# Patient Record
Sex: MALE | Race: WHITE | NOT HISPANIC OR LATINO | Employment: OTHER | URBAN - METROPOLITAN AREA
[De-identification: names, ages, dates, MRNs, and addresses within clinical notes are randomized per-mention and may not be internally consistent; named-entity substitution may affect disease eponyms.]

---

## 2017-11-16 ENCOUNTER — OFFICE VISIT (OUTPATIENT)
Dept: FAMILY MEDICINE CLINIC | Facility: CLINIC | Age: 42
End: 2017-11-16

## 2017-11-16 VITALS
OXYGEN SATURATION: 95 % | DIASTOLIC BLOOD PRESSURE: 74 MMHG | TEMPERATURE: 98 F | HEIGHT: 70 IN | HEART RATE: 82 BPM | WEIGHT: 160.4 LBS | BODY MASS INDEX: 22.96 KG/M2 | SYSTOLIC BLOOD PRESSURE: 116 MMHG

## 2017-11-16 DIAGNOSIS — Z00.00 PHYSICAL EXAM, ANNUAL: Primary | ICD-10-CM

## 2017-11-16 DIAGNOSIS — F41.1 GENERALIZED ANXIETY DISORDER: ICD-10-CM

## 2017-11-16 DIAGNOSIS — F34.0 MANIC-DEPRESSIVE PSYCHOSIS, CIRCULAR, NOT SPEC AS MANIC OR DEPRESS: ICD-10-CM

## 2017-11-16 DIAGNOSIS — J44.9 ASTHMATIC BRONCHITIS , CHRONIC (HCC): ICD-10-CM

## 2017-11-16 DIAGNOSIS — Z86.010 HISTORY OF COLON POLYPS: ICD-10-CM

## 2017-11-16 DIAGNOSIS — E55.9 VITAMIN D DEFICIENCY: ICD-10-CM

## 2017-11-16 DIAGNOSIS — R79.9 ABNORMAL FINDING OF BLOOD CHEMISTRY: ICD-10-CM

## 2017-11-16 LAB
25(OH)D3 SERPL-MCNC: 42.7 NG/ML (ref 30–100)
ALBUMIN SERPL-MCNC: 4.5 G/DL (ref 3.5–5.2)
ALBUMIN/GLOB SERPL: 1.4 G/DL
ALP SERPL-CCNC: 62 U/L (ref 39–117)
ALT SERPL W P-5'-P-CCNC: 10 U/L (ref 1–41)
ANION GAP SERPL CALCULATED.3IONS-SCNC: 10.2 MMOL/L
AST SERPL-CCNC: 13 U/L (ref 1–40)
BILIRUB SERPL-MCNC: 0.2 MG/DL (ref 0.1–1.2)
BUN BLD-MCNC: 17 MG/DL (ref 6–20)
BUN/CREAT SERPL: 17.7 (ref 7–25)
CALCIUM SPEC-SCNC: 9.6 MG/DL (ref 8.6–10.5)
CHLORIDE SERPL-SCNC: 98 MMOL/L (ref 98–107)
CHOLEST SERPL-MCNC: 188 MG/DL (ref 0–200)
CO2 SERPL-SCNC: 30.8 MMOL/L (ref 22–29)
CREAT BLD-MCNC: 0.96 MG/DL (ref 0.76–1.27)
ERYTHROCYTE [DISTWIDTH] IN BLOOD BY AUTOMATED COUNT: 15 % (ref 4.5–15)
GFR SERPL CREATININE-BSD FRML MDRD: 85 ML/MIN/1.73
GLOBULIN UR ELPH-MCNC: 3.2 GM/DL
GLUCOSE BLD-MCNC: 82 MG/DL (ref 65–99)
HCT VFR BLD AUTO: 46.7 % (ref 35–60)
HDLC SERPL-MCNC: 45 MG/DL (ref 40–60)
HGB BLD-MCNC: 15 G/DL (ref 13.5–18)
LDLC SERPL CALC-MCNC: 116 MG/DL (ref 0–100)
LDLC/HDLC SERPL: 2.58 {RATIO}
LYMPHOCYTES # BLD AUTO: 2 10*3/MM3 (ref 1.2–3.4)
LYMPHOCYTES NFR BLD AUTO: 23.2 % (ref 21–51)
MCH RBC QN AUTO: 28.4 PG (ref 26.1–33.1)
MCHC RBC AUTO-ENTMCNC: 32.1 G/DL (ref 33–37)
MCV RBC AUTO: 88.5 FL (ref 80–99)
MONOCYTES # BLD AUTO: 0.6 10*3/MM3 (ref 0.1–0.6)
MONOCYTES NFR BLD AUTO: 6.6 % (ref 2–9)
NEUTROPHILS # BLD AUTO: 6.1 10*3/MM3 (ref 1.4–6.5)
NEUTROPHILS NFR BLD AUTO: 70.2 % (ref 42–75)
PLATELET # BLD AUTO: 251 10*3/MM3 (ref 150–450)
PMV BLD AUTO: 7.3 FL (ref 7.1–10.5)
POTASSIUM BLD-SCNC: 4.5 MMOL/L (ref 3.5–5.2)
PROT SERPL-MCNC: 7.7 G/DL (ref 6–8.5)
RBC # BLD AUTO: 5.27 10*6/MM3 (ref 4–6)
SODIUM BLD-SCNC: 139 MMOL/L (ref 136–145)
T-UPTAKE NFR SERPL: 1.12 TBI (ref 0.8–1.3)
T4 SERPL-MCNC: 4.67 MCG/DL (ref 4.5–11.7)
TRIGL SERPL-MCNC: 134 MG/DL (ref 0–150)
TSH SERPL DL<=0.05 MIU/L-ACNC: 2.64 MIU/ML (ref 0.27–4.2)
VLDLC SERPL-MCNC: 26.8 MG/DL (ref 5–40)
WBC NRBC COR # BLD: 8.7 10*3/MM3 (ref 4.5–10)

## 2017-11-16 PROCEDURE — 80061 LIPID PANEL: CPT | Performed by: INTERNAL MEDICINE

## 2017-11-16 PROCEDURE — 82306 VITAMIN D 25 HYDROXY: CPT | Performed by: INTERNAL MEDICINE

## 2017-11-16 PROCEDURE — 84436 ASSAY OF TOTAL THYROXINE: CPT | Performed by: INTERNAL MEDICINE

## 2017-11-16 PROCEDURE — 80053 COMPREHEN METABOLIC PANEL: CPT | Performed by: INTERNAL MEDICINE

## 2017-11-16 PROCEDURE — 84479 ASSAY OF THYROID (T3 OR T4): CPT | Performed by: INTERNAL MEDICINE

## 2017-11-16 PROCEDURE — 84443 ASSAY THYROID STIM HORMONE: CPT | Performed by: INTERNAL MEDICINE

## 2017-11-16 PROCEDURE — 36415 COLL VENOUS BLD VENIPUNCTURE: CPT | Performed by: INTERNAL MEDICINE

## 2017-11-16 PROCEDURE — 99213 OFFICE O/P EST LOW 20 MIN: CPT | Performed by: INTERNAL MEDICINE

## 2017-11-16 PROCEDURE — 99386 PREV VISIT NEW AGE 40-64: CPT | Performed by: INTERNAL MEDICINE

## 2017-11-16 PROCEDURE — 85025 COMPLETE CBC W/AUTO DIFF WBC: CPT | Performed by: INTERNAL MEDICINE

## 2017-11-16 RX ORDER — DIVALPROEX SODIUM 250 MG/1
TABLET, DELAYED RELEASE ORAL
Refills: 0 | COMMUNITY
Start: 2017-10-23 | End: 2017-12-15 | Stop reason: DRUGHIGH

## 2017-11-16 RX ORDER — ALBUTEROL SULFATE 90 UG/1
2 AEROSOL, METERED RESPIRATORY (INHALATION) EVERY 4 HOURS PRN
COMMUNITY
End: 2017-11-16 | Stop reason: SDUPTHER

## 2017-11-16 RX ORDER — QUETIAPINE FUMARATE 300 MG/1
TABLET, FILM COATED ORAL
Refills: 3 | COMMUNITY
Start: 2017-10-31 | End: 2018-05-29 | Stop reason: SDUPTHER

## 2017-11-16 RX ORDER — ALBUTEROL SULFATE 90 UG/1
2 AEROSOL, METERED RESPIRATORY (INHALATION) EVERY 6 HOURS PRN
Qty: 1 INHALER | Refills: 3 | Status: SHIPPED | OUTPATIENT
Start: 2017-11-16 | End: 2018-03-22 | Stop reason: SDUPTHER

## 2017-11-16 RX ORDER — QUETIAPINE FUMARATE 25 MG/1
TABLET, FILM COATED ORAL
Refills: 1 | COMMUNITY
Start: 2017-10-19 | End: 2018-05-29 | Stop reason: SDUPTHER

## 2017-11-16 NOTE — PROGRESS NOTES
Marycarmen Pal is a 42 y.o. male.     History of Present Illness   Patient was a new patient seen for physical.  He does have manic depressive psychosis symptoms since being seen by a psychiatrist.  He also has chronic asthma and is on bronchodilators.  He also has generalized anxiety disorder is controlled with medication.  Patient does see a psychiatrist.  His symptoms a been well-controlled on medication.    Much of this encounter note is an electronic transcription/translation of spoken language to printed text.  The electronic translation of spoken language may permit erroneous, or at times, nonsensical words or phrases to be inadvertently transcribed.  Although I have reviewed the note for such errors, some may still exist.  The following portions of the patient's history were reviewed and updated as appropriate: allergies, current medications, past family history, past medical history, past social history, past surgical history and problem list.    Review of Systems   Constitutional: Negative for fatigue and fever.   HENT: Positive for congestion. Negative for trouble swallowing.    Eyes: Negative for discharge and visual disturbance.   Respiratory: Negative for choking and shortness of breath.    Cardiovascular: Negative for chest pain and palpitations.   Gastrointestinal: Negative for abdominal pain and blood in stool.   Endocrine: Negative.    Genitourinary: Negative for genital sores and hematuria.   Musculoskeletal: Negative for gait problem and joint swelling.   Skin: Negative for color change, pallor, rash and wound.   Allergic/Immunologic: Positive for environmental allergies. Negative for immunocompromised state.   Neurological: Negative for facial asymmetry and speech difficulty.   Psychiatric/Behavioral: Negative for hallucinations and suicidal ideas.       Objective   Physical Exam   Constitutional: He is oriented to person, place, and time. He appears well-developed and  well-nourished. No distress.   HENT:   Head: Normocephalic and atraumatic.   Right Ear: External ear normal.   Left Ear: External ear normal.   Nose: Nose normal.   Mouth/Throat: Oropharynx is clear and moist. No oropharyngeal exudate.   Eyes: Conjunctivae and EOM are normal. Pupils are equal, round, and reactive to light. Right eye exhibits no discharge. Left eye exhibits no discharge. No scleral icterus.   Neck: Normal range of motion. Neck supple. No JVD present. No tracheal deviation present. No thyromegaly present.   Cardiovascular: Normal rate, regular rhythm and normal heart sounds.  Exam reveals no gallop and no friction rub.    No murmur heard.  Pulmonary/Chest: Effort normal and breath sounds normal. No stridor. He has no rales. He exhibits no tenderness.   Abdominal: Soft. Bowel sounds are normal. He exhibits no distension and no mass. There is no tenderness. There is no guarding. No hernia.   Musculoskeletal: Normal range of motion. He exhibits no edema, tenderness or deformity.   Lymphadenopathy:     He has no cervical adenopathy.   Neurological: He is alert and oriented to person, place, and time. He has normal reflexes. He displays normal reflexes. He exhibits normal muscle tone. Coordination normal.   Skin: Skin is warm and dry. No rash noted. He is not diaphoretic. No erythema. No pallor.   Psychiatric: He has a normal mood and affect. His behavior is normal. Judgment and thought content normal.   Nursing note and vitals reviewed.      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    Relevant Medications    QUEtiapine (SEROquel) 25 MG tablet    QUEtiapine (SEROquel) 300 MG tablet    Other Relevant  Orders    CBC & Differential (Completed)    Comprehensive Metabolic Panel    Lipid Panel    Vitamin D 25 Hydroxy    Thyroid Panel With TSH    CBC Auto Differential (Completed)    History of colon polyps    Relevant Orders    CBC & Differential (Completed)    Comprehensive Metabolic Panel    Lipid Panel    Vitamin D 25 Hydroxy    Thyroid Panel With TSH    CBC Auto Differential (Completed)      Other Visit Diagnoses     Physical exam, annual    -  Primary    Abnormal finding of blood chemistry         Relevant Orders    Lipid Panel    Vitamin D deficiency         Relevant Orders    Vitamin D 25 Hydroxy    Generalized anxiety disorder         Relevant Medications    QUEtiapine (SEROquel) 25 MG tablet    QUEtiapine (SEROquel) 300 MG tablet    Other Relevant Orders    Thyroid Panel With TSH

## 2017-12-15 ENCOUNTER — OFFICE VISIT (OUTPATIENT)
Dept: FAMILY MEDICINE CLINIC | Facility: CLINIC | Age: 42
End: 2017-12-15

## 2017-12-15 VITALS
OXYGEN SATURATION: 96 % | BODY MASS INDEX: 23.62 KG/M2 | DIASTOLIC BLOOD PRESSURE: 70 MMHG | RESPIRATION RATE: 13 BRPM | HEART RATE: 89 BPM | TEMPERATURE: 97.9 F | SYSTOLIC BLOOD PRESSURE: 112 MMHG | WEIGHT: 165 LBS | HEIGHT: 70 IN

## 2017-12-15 DIAGNOSIS — F32.A DEPRESSION, UNSPECIFIED DEPRESSION TYPE: ICD-10-CM

## 2017-12-15 DIAGNOSIS — F20.9 SCHIZOPHRENIA, UNSPECIFIED TYPE (HCC): Primary | ICD-10-CM

## 2017-12-15 DIAGNOSIS — J45.909 ASTHMA, UNSPECIFIED ASTHMA SEVERITY, UNSPECIFIED WHETHER COMPLICATED, UNSPECIFIED WHETHER PERSISTENT: ICD-10-CM

## 2017-12-15 PROCEDURE — 99214 OFFICE O/P EST MOD 30 MIN: CPT | Performed by: INTERNAL MEDICINE

## 2017-12-15 RX ORDER — ERGOCALCIFEROL 1.25 MG/1
50000 CAPSULE ORAL WEEKLY
Qty: 30 CAPSULE | Refills: 3 | Status: SHIPPED | OUTPATIENT
Start: 2017-12-15 | End: 2018-03-16

## 2017-12-15 RX ORDER — DIVALPROEX SODIUM 500 MG/1
500 TABLET, DELAYED RELEASE ORAL 2 TIMES DAILY
COMMUNITY
End: 2018-05-29 | Stop reason: SDUPTHER

## 2017-12-15 NOTE — PROGRESS NOTES
Marycarmen Pal is a 42 y.o. male.     History of Present Illness   She was follow-up for labs.  Triglycerides 134, HDL 45, , TSH 2.6, T4 4 0.6, vitamin D 42.7, H&H of 15/46.7, blood sugar was 82.  Patient also has schizophrenia is been well-controlled with medication .  His asthma also been well-controlled with bronchodilators.  History of depression also controlled with medication.  She does have a psychiatrist and follows up for regular basis    Much of this encounter note is an electronic transcription/translation of spoken language to printed text.  The electronic translation of spoken language may permit erroneous, or at times, nonsensical words or phrases to be inadvertently transcribed.  Although I have reviewed the note for such errors, some may still exist.  The following portions of the patient's history were reviewed and updated as appropriate: allergies, current medications, past family history, past medical history, past social history, past surgical history and problem list.    Review of Systems   Constitutional: Negative for fatigue and fever.   HENT: Positive for congestion. Negative for trouble swallowing.    Eyes: Negative for discharge and visual disturbance.   Respiratory: Negative for choking and shortness of breath.    Cardiovascular: Negative for chest pain and palpitations.   Gastrointestinal: Negative for abdominal pain and blood in stool.   Endocrine: Negative.    Genitourinary: Negative for genital sores and hematuria.   Musculoskeletal: Negative for gait problem and joint swelling.   Skin: Negative for color change, pallor, rash and wound.   Allergic/Immunologic: Positive for environmental allergies. Negative for immunocompromised state.   Neurological: Negative for facial asymmetry and speech difficulty.   Psychiatric/Behavioral: Negative for hallucinations and suicidal ideas.       Objective   Physical Exam   Constitutional: He is oriented to person, place, and time.  He appears well-developed and well-nourished.   HENT:   Head: Normocephalic.   Eyes: Conjunctivae are normal. Pupils are equal, round, and reactive to light.   Neck: Normal range of motion. Neck supple.   Cardiovascular: Normal rate, regular rhythm and normal heart sounds.    Pulmonary/Chest: Effort normal and breath sounds normal.   Abdominal: Soft. Bowel sounds are normal.   Musculoskeletal: Normal range of motion.   Neurological: He is alert and oriented to person, place, and time.   Skin: Skin is warm and dry.   Psychiatric: He has a normal mood and affect. His behavior is normal. Judgment and thought content normal.   Nursing note and vitals reviewed.      Assessment/Plan   Problems Addressed this Visit        Respiratory    Asthma       Other    Schizophrenia - Primary    Depression

## 2018-02-15 ENCOUNTER — TELEPHONE (OUTPATIENT)
Dept: FAMILY MEDICINE CLINIC | Facility: CLINIC | Age: 43
End: 2018-02-15

## 2018-02-15 NOTE — TELEPHONE ENCOUNTER
spiriva not covered by patients insurance, however advair and breo are, and  the cheapest so can we change the order and send to pharmacy for one of these?

## 2018-03-16 ENCOUNTER — OFFICE VISIT (OUTPATIENT)
Dept: FAMILY MEDICINE CLINIC | Facility: CLINIC | Age: 43
End: 2018-03-16

## 2018-03-16 VITALS
HEIGHT: 70 IN | SYSTOLIC BLOOD PRESSURE: 100 MMHG | DIASTOLIC BLOOD PRESSURE: 68 MMHG | HEART RATE: 84 BPM | BODY MASS INDEX: 24.85 KG/M2 | WEIGHT: 173.6 LBS | OXYGEN SATURATION: 98 % | TEMPERATURE: 98.1 F

## 2018-03-16 DIAGNOSIS — M79.673 PAIN OF FOOT, UNSPECIFIED LATERALITY: Primary | ICD-10-CM

## 2018-03-16 DIAGNOSIS — E78.5 DYSLIPIDEMIA: ICD-10-CM

## 2018-03-16 DIAGNOSIS — E55.9 VITAMIN D DEFICIENCY: ICD-10-CM

## 2018-03-16 DIAGNOSIS — J45.21 INTERMITTENT ASTHMA WITH ACUTE EXACERBATION, UNSPECIFIED ASTHMA SEVERITY: ICD-10-CM

## 2018-03-16 DIAGNOSIS — J44.9 ASTHMATIC BRONCHITIS , CHRONIC (HCC): ICD-10-CM

## 2018-03-16 PROCEDURE — 99213 OFFICE O/P EST LOW 20 MIN: CPT | Performed by: INTERNAL MEDICINE

## 2018-03-16 NOTE — PROGRESS NOTES
Marycarmen Pal is a 43 y.o. male.     History of Present Illness   Patient was seen for right foot pain.  He had dropped to 40 pound weight on it and it was ecchymotic.  Patient stated it was not broken did not want an x-ray.  Patient was advised to put ice packs on it and walk well supported shoes.    Much of this encounter note is an electronic transcription/translation of spoken language to printed text.  The electronic translation of spoken language may permit erroneous, or at times, nonsensical words or phrases to be inadvertently transcribed.  Although I have reviewed the note for such errors, some may still exist.  The following portions of the patient's history were reviewed and updated as appropriate: allergies, current medications, past family history, past medical history, past social history, past surgical history and problem list.    Review of Systems   Constitutional: Negative for fatigue and fever.   HENT: Positive for congestion. Negative for trouble swallowing.    Eyes: Negative for discharge and visual disturbance.   Respiratory: Negative for choking and shortness of breath.    Cardiovascular: Negative for chest pain and palpitations.   Gastrointestinal: Negative for abdominal pain and blood in stool.   Endocrine: Negative.    Genitourinary: Negative for genital sores and hematuria.   Musculoskeletal: Negative for gait problem and joint swelling.   Skin: Negative for color change, pallor, rash and wound.   Allergic/Immunologic: Positive for environmental allergies. Negative for immunocompromised state.   Neurological: Negative for facial asymmetry and speech difficulty.   Psychiatric/Behavioral: Negative for hallucinations and suicidal ideas.       Objective   Physical Exam   Constitutional: He is oriented to person, place, and time. He appears well-developed and well-nourished.   HENT:   Head: Normocephalic.   Eyes: Conjunctivae are normal. Pupils are equal, round, and reactive to  light.   Neck: Normal range of motion. Neck supple.   Cardiovascular: Normal rate, regular rhythm and normal heart sounds.    Pulmonary/Chest: Effort normal and breath sounds normal.   Abdominal: Soft. Bowel sounds are normal.   Musculoskeletal: Normal range of motion.   Neurological: He is alert and oriented to person, place, and time.   Skin: Skin is warm and dry.   Psychiatric: He has a normal mood and affect. His behavior is normal. Judgment and thought content normal.   Nursing note and vitals reviewed.      Assessment/Plan   Problems Addressed this Visit        Respiratory    Asthma    Relevant Orders    Comprehensive Metabolic Panel    CBC & Differential    Lipid Panel    Vitamin D 25 Hydroxy       Other    Asthmatic bronchitis , chronic    Relevant Orders    Comprehensive Metabolic Panel    CBC & Differential    Lipid Panel    Vitamin D 25 Hydroxy    Dyslipidemia    Relevant Orders    Comprehensive Metabolic Panel    CBC & Differential    Lipid Panel    Vitamin D 25 Hydroxy      Other Visit Diagnoses     Pain of foot, unspecified laterality    -  Primary    Relevant Orders    Comprehensive Metabolic Panel    CBC & Differential    Lipid Panel    Vitamin D 25 Hydroxy    Vitamin D deficiency         Relevant Orders    Vitamin D 25 Hydroxy

## 2018-03-22 RX ORDER — ALBUTEROL SULFATE 90 UG/1
2 AEROSOL, METERED RESPIRATORY (INHALATION) EVERY 6 HOURS PRN
Qty: 18 INHALER | Refills: 3 | Status: SHIPPED | OUTPATIENT
Start: 2018-03-22 | End: 2018-06-20 | Stop reason: SDUPTHER

## 2018-03-23 ENCOUNTER — TELEPHONE (OUTPATIENT)
Dept: FAMILY MEDICINE CLINIC | Facility: CLINIC | Age: 43
End: 2018-03-23

## 2018-05-29 ENCOUNTER — TELEPHONE (OUTPATIENT)
Dept: FAMILY MEDICINE CLINIC | Facility: CLINIC | Age: 43
End: 2018-05-29

## 2018-05-29 RX ORDER — QUETIAPINE FUMARATE 300 MG/1
300 TABLET, FILM COATED ORAL NIGHTLY
Qty: 30 TABLET | Refills: 0 | Status: SHIPPED | OUTPATIENT
Start: 2018-05-29 | End: 2018-06-26 | Stop reason: SDUPTHER

## 2018-05-29 RX ORDER — QUETIAPINE FUMARATE 25 MG/1
25 TABLET, FILM COATED ORAL NIGHTLY
Qty: 30 TABLET | Refills: 0 | Status: SHIPPED | OUTPATIENT
Start: 2018-05-29 | End: 2018-06-26 | Stop reason: SDUPTHER

## 2018-05-29 RX ORDER — SERTRALINE HYDROCHLORIDE 100 MG/1
100 TABLET, FILM COATED ORAL DAILY
Qty: 30 TABLET | Refills: 0 | Status: SHIPPED | OUTPATIENT
Start: 2018-05-29 | End: 2018-07-20 | Stop reason: SDUPTHER

## 2018-05-29 RX ORDER — DIVALPROEX SODIUM 500 MG/1
TABLET, DELAYED RELEASE ORAL
Qty: 60 TABLET | Refills: 0 | Status: SHIPPED | OUTPATIENT
Start: 2018-05-29 | End: 2018-06-26 | Stop reason: SDUPTHER

## 2018-05-29 NOTE — TELEPHONE ENCOUNTER
PT STATES THAT HE HAS LOST CENTERSTONE, AND IS ASKING IF DR. TINOCO CAN REFILL HIS PRESCRIPTIONS?   DEPAKOTE, TWICE DAILY   SEROQUEL 25 MG, TWICE DAILY  ALSO NEEDS REFILLS ON HIS INHALERS

## 2018-05-29 NOTE — TELEPHONE ENCOUNTER
Pt is manic,schizo,pyschosis Needs to find new psychiatrist! Will ask doctor to fill one month only up to him,may not do

## 2018-06-20 ENCOUNTER — TELEPHONE (OUTPATIENT)
Dept: FAMILY MEDICINE CLINIC | Facility: CLINIC | Age: 43
End: 2018-06-20

## 2018-06-20 RX ORDER — ALBUTEROL SULFATE 90 UG/1
2 AEROSOL, METERED RESPIRATORY (INHALATION) EVERY 6 HOURS PRN
Qty: 18 INHALER | Refills: 3 | Status: SHIPPED | OUTPATIENT
Start: 2018-06-20

## 2018-06-26 RX ORDER — QUETIAPINE FUMARATE 25 MG/1
TABLET, FILM COATED ORAL
Qty: 30 TABLET | Refills: 0 | Status: SHIPPED | OUTPATIENT
Start: 2018-06-26 | End: 2018-09-10 | Stop reason: SDUPTHER

## 2018-06-26 RX ORDER — DIVALPROEX SODIUM 500 MG/1
TABLET, DELAYED RELEASE ORAL
Qty: 60 TABLET | Refills: 0 | Status: SHIPPED | OUTPATIENT
Start: 2018-06-26

## 2018-06-26 RX ORDER — QUETIAPINE FUMARATE 300 MG/1
TABLET, FILM COATED ORAL
Qty: 30 TABLET | Refills: 0 | Status: SHIPPED | OUTPATIENT
Start: 2018-06-26 | End: 2018-08-29 | Stop reason: SDUPTHER

## 2018-07-20 RX ORDER — SERTRALINE HYDROCHLORIDE 100 MG/1
TABLET, FILM COATED ORAL
Qty: 30 TABLET | Refills: 0 | Status: SHIPPED | OUTPATIENT
Start: 2018-07-20

## 2018-08-07 RX ORDER — DIVALPROEX SODIUM 500 MG/1
TABLET, DELAYED RELEASE ORAL
Qty: 60 TABLET | Refills: 0 | OUTPATIENT
Start: 2018-08-07

## 2018-08-29 RX ORDER — QUETIAPINE FUMARATE 300 MG/1
TABLET, FILM COATED ORAL
Qty: 30 TABLET | Refills: 0 | Status: SHIPPED | OUTPATIENT
Start: 2018-08-29 | End: 2018-10-02 | Stop reason: SDUPTHER

## 2018-09-10 RX ORDER — QUETIAPINE FUMARATE 25 MG/1
25 TABLET, FILM COATED ORAL NIGHTLY
Qty: 90 TABLET | Refills: 1 | Status: SHIPPED | OUTPATIENT
Start: 2018-09-10 | End: 2019-04-02 | Stop reason: SDUPTHER

## 2018-09-13 ENCOUNTER — OFFICE VISIT (OUTPATIENT)
Dept: FAMILY MEDICINE CLINIC | Facility: CLINIC | Age: 43
End: 2018-09-13

## 2018-09-13 VITALS
BODY MASS INDEX: 27.63 KG/M2 | SYSTOLIC BLOOD PRESSURE: 128 MMHG | DIASTOLIC BLOOD PRESSURE: 74 MMHG | TEMPERATURE: 98.4 F | HEIGHT: 70 IN | WEIGHT: 193 LBS | HEART RATE: 81 BPM | OXYGEN SATURATION: 98 %

## 2018-09-13 DIAGNOSIS — E78.5 DYSLIPIDEMIA: ICD-10-CM

## 2018-09-13 DIAGNOSIS — E55.9 VITAMIN D DEFICIENCY: ICD-10-CM

## 2018-09-13 DIAGNOSIS — J45.20 INTERMITTENT ASTHMA, UNSPECIFIED ASTHMA SEVERITY, UNSPECIFIED WHETHER COMPLICATED: Primary | ICD-10-CM

## 2018-09-13 DIAGNOSIS — F34.0 MANIC-DEPRESSIVE PSYCHOSIS, CIRCULAR, NOT SPEC AS MANIC OR DEPRESS: ICD-10-CM

## 2018-09-13 PROCEDURE — 99214 OFFICE O/P EST MOD 30 MIN: CPT | Performed by: INTERNAL MEDICINE

## 2018-09-13 RX ORDER — SOFT LENS DISINFECTANT
1 SOLUTION, NON-ORAL MISCELLANEOUS 4 TIMES DAILY PRN
Qty: 1 EACH | Refills: 1 | Status: SHIPPED | OUTPATIENT
Start: 2018-09-13

## 2018-09-13 RX ORDER — IPRATROPIUM BROMIDE AND ALBUTEROL SULFATE 2.5; .5 MG/3ML; MG/3ML
3 SOLUTION RESPIRATORY (INHALATION) EVERY 4 HOURS PRN
Qty: 120 ML | Refills: 3 | Status: SHIPPED | OUTPATIENT
Start: 2018-09-13 | End: 2018-09-19 | Stop reason: SDUPTHER

## 2018-09-13 NOTE — PROGRESS NOTES
Subjective   Armaan Pal is a 43 y.o. male.     History of Present Illness   Is seen for a six-month follow-up on asthma.  Patient is actively wheezing at present time but does not take a breathing treatment today.  A she is usually controlled on his bronchodilators.  She preferred to get a Mini-Neb he states it's easier to use that his inhaler.  Patient has difficulty timing of breath with his inhaler.  Patient was informed they were the same medicine and not to use them concurrently.  Patient not met expressive has been under control with medication.  His lipids have been controlled with diet and exercise.  Patient will follow-up in 4 months.  Patient was advised to quit smoking.    Dictated utilizing Dragon dictation. If there are questions or for further clarification, please contact me.   The following portions of the patient's history were reviewed and updated as appropriate: allergies, current medications, past family history, past medical history, past social history, past surgical history and problem list.    Review of Systems   Constitutional: Negative for fatigue and fever.   HENT: Positive for congestion. Negative for trouble swallowing.    Eyes: Negative for discharge and visual disturbance.   Respiratory: Positive for wheezing. Negative for choking and shortness of breath.    Cardiovascular: Negative for chest pain and palpitations.   Gastrointestinal: Negative for abdominal pain and blood in stool.   Endocrine: Negative.    Genitourinary: Negative for genital sores and hematuria.   Musculoskeletal: Negative for gait problem and joint swelling.   Skin: Negative for color change, pallor, rash and wound.   Allergic/Immunologic: Positive for environmental allergies. Negative for immunocompromised state.   Neurological: Negative for facial asymmetry and speech difficulty.   Psychiatric/Behavioral: Negative for hallucinations and suicidal ideas.       Objective   Physical Exam   Constitutional: He is  oriented to person, place, and time. He appears well-developed and well-nourished.   HENT:   Head: Normocephalic.   Eyes: Pupils are equal, round, and reactive to light. Conjunctivae are normal.   Neck: Normal range of motion. Neck supple.   Cardiovascular: Normal rate, regular rhythm and normal heart sounds.    Pulmonary/Chest: Effort normal. He has wheezes.   Abdominal: Soft. Bowel sounds are normal.   Musculoskeletal: Normal range of motion.   Neurological: He is alert and oriented to person, place, and time.   Skin: Skin is warm and dry.   Psychiatric: He has a normal mood and affect. His behavior is normal. Judgment and thought content normal.   Nursing note and vitals reviewed.      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

## 2018-09-19 RX ORDER — IPRATROPIUM BROMIDE AND ALBUTEROL SULFATE 2.5; .5 MG/3ML; MG/3ML
3 SOLUTION RESPIRATORY (INHALATION) EVERY 4 HOURS PRN
Qty: 120 ML | Refills: 3 | Status: SHIPPED | OUTPATIENT
Start: 2018-09-19 | End: 2018-09-24 | Stop reason: SDUPTHER

## 2018-09-20 ENCOUNTER — TELEPHONE (OUTPATIENT)
Dept: FAMILY MEDICINE CLINIC | Facility: CLINIC | Age: 43
End: 2018-09-20

## 2018-09-20 NOTE — TELEPHONE ENCOUNTER
Just an FYI that patient is getting his flu shot at [facility], just wanted to let dr nascimento know. For chart purposes.

## 2018-09-24 RX ORDER — IPRATROPIUM BROMIDE AND ALBUTEROL SULFATE 2.5; .5 MG/3ML; MG/3ML
3 SOLUTION RESPIRATORY (INHALATION) EVERY 4 HOURS PRN
Qty: 120 ML | Refills: 3 | Status: SHIPPED | OUTPATIENT
Start: 2018-09-24 | End: 2018-10-02 | Stop reason: SDUPTHER

## 2018-09-24 RX ORDER — IPRATROPIUM BROMIDE AND ALBUTEROL SULFATE 2.5; .5 MG/3ML; MG/3ML
3 SOLUTION RESPIRATORY (INHALATION) EVERY 4 HOURS PRN
Qty: 120 ML | Refills: 3 | Status: SHIPPED | OUTPATIENT
Start: 2018-09-24 | End: 2018-09-24 | Stop reason: SDUPTHER

## 2018-10-02 RX ORDER — IPRATROPIUM BROMIDE AND ALBUTEROL SULFATE 2.5; .5 MG/3ML; MG/3ML
3 SOLUTION RESPIRATORY (INHALATION) EVERY 4 HOURS PRN
Qty: 120 ML | Refills: 3 | Status: SHIPPED | OUTPATIENT
Start: 2018-10-02 | End: 2019-01-24 | Stop reason: SDUPTHER

## 2018-10-02 RX ORDER — QUETIAPINE FUMARATE 300 MG/1
300 TABLET, FILM COATED ORAL NIGHTLY
Qty: 90 TABLET | Refills: 3 | Status: SHIPPED | OUTPATIENT
Start: 2018-10-02

## 2018-10-23 RX ORDER — IPRATROPIUM BROMIDE AND ALBUTEROL SULFATE 2.5; .5 MG/3ML; MG/3ML
3 SOLUTION RESPIRATORY (INHALATION) EVERY 4 HOURS PRN
Qty: 120 ML | Refills: 0 | Status: SHIPPED | OUTPATIENT
Start: 2018-10-23 | End: 2019-01-17 | Stop reason: SDUPTHER

## 2019-01-17 ENCOUNTER — OFFICE VISIT (OUTPATIENT)
Dept: FAMILY MEDICINE CLINIC | Facility: CLINIC | Age: 44
End: 2019-01-17

## 2019-01-17 VITALS
HEIGHT: 70 IN | HEART RATE: 78 BPM | OXYGEN SATURATION: 98 % | BODY MASS INDEX: 29.15 KG/M2 | WEIGHT: 203.6 LBS | DIASTOLIC BLOOD PRESSURE: 68 MMHG | TEMPERATURE: 98 F | SYSTOLIC BLOOD PRESSURE: 112 MMHG

## 2019-01-17 DIAGNOSIS — F20.81 SCHIZOPHRENIFORM DISORDER (HCC): ICD-10-CM

## 2019-01-17 DIAGNOSIS — J45.20 INTERMITTENT ASTHMA, UNSPECIFIED ASTHMA SEVERITY, UNSPECIFIED WHETHER COMPLICATED: Primary | ICD-10-CM

## 2019-01-17 DIAGNOSIS — E78.5 DYSLIPIDEMIA: ICD-10-CM

## 2019-01-17 DIAGNOSIS — E55.9 VITAMIN D DEFICIENCY: ICD-10-CM

## 2019-01-17 LAB
25(OH)D3 SERPL-MCNC: 46.4 NG/ML (ref 30–100)
ALBUMIN SERPL-MCNC: 4 G/DL (ref 3.5–5.2)
ALBUMIN/GLOB SERPL: 1.5 G/DL
ALP SERPL-CCNC: 46 U/L (ref 39–117)
ALT SERPL W P-5'-P-CCNC: 19 U/L (ref 1–41)
ANION GAP SERPL CALCULATED.3IONS-SCNC: 11.9 MMOL/L
AST SERPL-CCNC: 15 U/L (ref 1–40)
BILIRUB SERPL-MCNC: 0.2 MG/DL (ref 0.1–1.2)
BUN BLD-MCNC: 11 MG/DL (ref 6–20)
BUN/CREAT SERPL: 11.8 (ref 7–25)
CALCIUM SPEC-SCNC: 8.9 MG/DL (ref 8.6–10.5)
CHLORIDE SERPL-SCNC: 94 MMOL/L (ref 98–107)
CHOLEST SERPL-MCNC: 170 MG/DL (ref 0–200)
CO2 SERPL-SCNC: 29.1 MMOL/L (ref 22–29)
CREAT BLD-MCNC: 0.93 MG/DL (ref 0.76–1.27)
ERYTHROCYTE [DISTWIDTH] IN BLOOD BY AUTOMATED COUNT: 13.4 % (ref 4.5–15)
GFR SERPL CREATININE-BSD FRML MDRD: 88 ML/MIN/1.73
GLOBULIN UR ELPH-MCNC: 2.6 GM/DL
GLUCOSE BLD-MCNC: 89 MG/DL (ref 65–99)
HCT VFR BLD AUTO: 45.7 % (ref 35–60)
HDLC SERPL-MCNC: 36 MG/DL (ref 40–60)
HGB BLD-MCNC: 15 G/DL (ref 13.5–18)
LDLC SERPL CALC-MCNC: 87 MG/DL (ref 0–100)
LDLC/HDLC SERPL: 2.41 {RATIO}
LYMPHOCYTES # BLD AUTO: 2.2 10*3/MM3 (ref 1.2–3.4)
LYMPHOCYTES NFR BLD AUTO: 26.4 % (ref 21–51)
MCH RBC QN AUTO: 31 PG (ref 26.1–33.1)
MCHC RBC AUTO-ENTMCNC: 32.8 G/DL (ref 33–37)
MCV RBC AUTO: 94.8 FL (ref 80–99)
MONOCYTES # BLD AUTO: 0.3 10*3/MM3 (ref 0.1–0.6)
MONOCYTES NFR BLD AUTO: 3.3 % (ref 2–9)
NEUTROPHILS # BLD AUTO: 5.9 10*3/MM3 (ref 1.4–6.5)
NEUTROPHILS NFR BLD AUTO: 70.3 % (ref 42–75)
PLATELET # BLD AUTO: 186 10*3/MM3 (ref 150–450)
PMV BLD AUTO: 7.9 FL (ref 7.1–10.5)
POTASSIUM BLD-SCNC: 4 MMOL/L (ref 3.5–5.2)
PROT SERPL-MCNC: 6.6 G/DL (ref 6–8.5)
RBC # BLD AUTO: 4.82 10*6/MM3 (ref 4–6)
SODIUM BLD-SCNC: 135 MMOL/L (ref 136–145)
TRIGL SERPL-MCNC: 237 MG/DL (ref 0–150)
VLDLC SERPL-MCNC: 47.4 MG/DL (ref 5–40)
WBC NRBC COR # BLD: 8.4 10*3/MM3 (ref 4.5–10)

## 2019-01-17 PROCEDURE — 82306 VITAMIN D 25 HYDROXY: CPT | Performed by: INTERNAL MEDICINE

## 2019-01-17 PROCEDURE — 85025 COMPLETE CBC W/AUTO DIFF WBC: CPT | Performed by: INTERNAL MEDICINE

## 2019-01-17 PROCEDURE — 80061 LIPID PANEL: CPT | Performed by: INTERNAL MEDICINE

## 2019-01-17 PROCEDURE — 99214 OFFICE O/P EST MOD 30 MIN: CPT | Performed by: INTERNAL MEDICINE

## 2019-01-17 PROCEDURE — 36415 COLL VENOUS BLD VENIPUNCTURE: CPT | Performed by: INTERNAL MEDICINE

## 2019-01-17 PROCEDURE — 80053 COMPREHEN METABOLIC PANEL: CPT | Performed by: INTERNAL MEDICINE

## 2019-01-17 NOTE — PROGRESS NOTES
Marycarmen Pal is a 43 y.o. male.     History of Present Illness   Patient was seen asthma.  Patient asthma is been stabilized on his bronchodilators.  Patient does have schizophrenia seen a psychologist in stable on medication.  His triglycerides were 237, HDL 36, LDL 87.  Patient is on a statin and did not want additional medication.  Patient is continue his low-cholesterol diet and return to our clinic in 6 months.    Dictated utilizing Dragon dictation. If there are questions or for further clarification, please contact me.   The following portions of the patient's history were reviewed and updated as appropriate: allergies, current medications, past family history, past medical history, past social history, past surgical history and problem list.    Review of Systems   Constitutional: Negative for fatigue and fever.   HENT: Positive for congestion. Negative for trouble swallowing.    Eyes: Negative for discharge and visual disturbance.   Respiratory: Negative for choking and shortness of breath.    Cardiovascular: Negative for chest pain and palpitations.   Gastrointestinal: Negative for abdominal pain and blood in stool.   Endocrine: Negative.    Genitourinary: Negative for genital sores and hematuria.   Musculoskeletal: Negative for gait problem and joint swelling.   Skin: Negative for color change, pallor, rash and wound.   Allergic/Immunologic: Positive for environmental allergies. Negative for immunocompromised state.   Neurological: Negative for facial asymmetry and speech difficulty.   Psychiatric/Behavioral: Negative for hallucinations and suicidal ideas.       Objective   Physical Exam   Constitutional: He is oriented to person, place, and time. He appears well-developed and well-nourished.   HENT:   Head: Normocephalic.   Eyes: Conjunctivae are normal. Pupils are equal, round, and reactive to light.   Neck: Normal range of motion. Neck supple.   Cardiovascular: Normal rate, regular rhythm  and normal heart sounds.   Pulmonary/Chest: Effort normal and breath sounds normal.   Abdominal: Soft. Bowel sounds are normal.   Musculoskeletal: Normal range of motion.   Neurological: He is alert and oriented to person, place, and time.   Skin: Skin is warm and dry.   Psychiatric: His behavior is normal. Judgment and thought content normal.   Patient has schizophrenia seen a psychiatrist   Nursing note and vitals reviewed.      Assessment/Plan   Problems Addressed this Visit        Respiratory    Asthma - Primary       Other    Schizophrenia (CMS/[facility])    Dyslipidemia      Other Visit Diagnoses     Vitamin D deficiency

## 2019-01-24 RX ORDER — IPRATROPIUM BROMIDE AND ALBUTEROL SULFATE 2.5; .5 MG/3ML; MG/3ML
3 SOLUTION RESPIRATORY (INHALATION) EVERY 4 HOURS PRN
Qty: 120 ML | Refills: 3 | Status: SHIPPED | OUTPATIENT
Start: 2019-01-24 | End: 2019-04-25 | Stop reason: SDUPTHER

## 2019-01-24 NOTE — TELEPHONE ENCOUNTER
Needs refills on ipratropium-albuterol (DUO-NEB) 0.5-2.5 mg/3 ml nebulizer asap he is not out but he will need a refill..

## 2019-04-02 RX ORDER — QUETIAPINE FUMARATE 25 MG/1
TABLET, FILM COATED ORAL
Qty: 90 TABLET | Refills: 1 | Status: SHIPPED | OUTPATIENT
Start: 2019-04-02 | End: 2019-07-18 | Stop reason: DRUGHIGH

## 2019-04-25 RX ORDER — IPRATROPIUM BROMIDE AND ALBUTEROL SULFATE 2.5; .5 MG/3ML; MG/3ML
3 SOLUTION RESPIRATORY (INHALATION) EVERY 4 HOURS PRN
Qty: 180 ML | Refills: 1 | Status: SHIPPED | OUTPATIENT
Start: 2019-04-25 | End: 2019-04-30 | Stop reason: SDUPTHER

## 2019-04-30 RX ORDER — IPRATROPIUM BROMIDE AND ALBUTEROL SULFATE 2.5; .5 MG/3ML; MG/3ML
3 SOLUTION RESPIRATORY (INHALATION) EVERY 4 HOURS PRN
Qty: 180 ML | Refills: 1 | Status: SHIPPED | OUTPATIENT
Start: 2019-04-30 | End: 2019-05-24 | Stop reason: SDUPTHER

## 2019-05-24 RX ORDER — IPRATROPIUM BROMIDE AND ALBUTEROL SULFATE 2.5; .5 MG/3ML; MG/3ML
3 SOLUTION RESPIRATORY (INHALATION) EVERY 4 HOURS PRN
Qty: 180 ML | Refills: 1 | Status: SHIPPED | OUTPATIENT
Start: 2019-05-24 | End: 2019-05-30 | Stop reason: SDUPTHER

## 2019-05-30 RX ORDER — IPRATROPIUM BROMIDE AND ALBUTEROL SULFATE 2.5; .5 MG/3ML; MG/3ML
3 SOLUTION RESPIRATORY (INHALATION) EVERY 4 HOURS PRN
Qty: 180 ML | Refills: 1 | Status: SHIPPED | OUTPATIENT
Start: 2019-05-30 | End: 2019-07-03 | Stop reason: SDUPTHER

## 2019-07-03 RX ORDER — IPRATROPIUM BROMIDE AND ALBUTEROL SULFATE 2.5; .5 MG/3ML; MG/3ML
3 SOLUTION RESPIRATORY (INHALATION) EVERY 4 HOURS PRN
Qty: 180 ML | Refills: 1 | Status: SHIPPED | OUTPATIENT
Start: 2019-07-03 | End: 2019-07-09 | Stop reason: SDUPTHER

## 2019-07-09 RX ORDER — IPRATROPIUM BROMIDE AND ALBUTEROL SULFATE 2.5; .5 MG/3ML; MG/3ML
3 SOLUTION RESPIRATORY (INHALATION) EVERY 4 HOURS PRN
Qty: 180 ML | Refills: 1 | Status: SHIPPED | OUTPATIENT
Start: 2019-07-09 | End: 2019-09-18 | Stop reason: SDUPTHER

## 2019-07-18 ENCOUNTER — OFFICE VISIT (OUTPATIENT)
Dept: FAMILY MEDICINE CLINIC | Facility: CLINIC | Age: 44
End: 2019-07-18

## 2019-07-18 VITALS
TEMPERATURE: 98.3 F | OXYGEN SATURATION: 97 % | HEIGHT: 70 IN | RESPIRATION RATE: 16 BRPM | SYSTOLIC BLOOD PRESSURE: 110 MMHG | HEART RATE: 85 BPM | BODY MASS INDEX: 28.63 KG/M2 | DIASTOLIC BLOOD PRESSURE: 60 MMHG | WEIGHT: 200 LBS

## 2019-07-18 DIAGNOSIS — E55.9 VITAMIN D DEFICIENCY: ICD-10-CM

## 2019-07-18 DIAGNOSIS — J45.20 MILD INTERMITTENT ASTHMA WITHOUT COMPLICATION: Primary | ICD-10-CM

## 2019-07-18 DIAGNOSIS — E78.5 DYSLIPIDEMIA: ICD-10-CM

## 2019-07-18 DIAGNOSIS — F20.3 UNDIFFERENTIATED SCHIZOPHRENIA (HCC): ICD-10-CM

## 2019-07-18 PROCEDURE — 99214 OFFICE O/P EST MOD 30 MIN: CPT | Performed by: INTERNAL MEDICINE

## 2019-07-18 NOTE — PROGRESS NOTES
Marycarmen Pal is a 44 y.o. male.     History of Present Illness   Patient was seen for intermittent asthma.  Patient is well controlled with his bronchodilators.  Lipids controlled with diet and exercise.  Patient does have schizophrenia is being seen by a psychiatrist stable at the present time.    Dictated utilizing Dragon dictation. If there are questions or for further clarification, please contact me.  The following portions of the patient's history were reviewed and updated as appropriate: allergies, current medications, past family history, past medical history, past social history, past surgical history and problem list.    Review of Systems   Constitutional: Negative for fatigue and fever.   HENT: Positive for congestion. Negative for trouble swallowing.    Eyes: Negative for discharge and visual disturbance.   Respiratory: Negative for choking and shortness of breath.    Cardiovascular: Negative for chest pain and palpitations.   Gastrointestinal: Negative for abdominal pain and blood in stool.   Endocrine: Negative.    Genitourinary: Negative for genital sores and hematuria.   Musculoskeletal: Negative for gait problem and joint swelling.   Skin: Negative for color change, pallor, rash and wound.   Allergic/Immunologic: Positive for environmental allergies. Negative for immunocompromised state.   Neurological: Negative for facial asymmetry and speech difficulty.   Psychiatric/Behavioral: Negative for behavioral problems, confusion, decreased concentration, dysphoric mood, hallucinations and suicidal ideas.       Objective   Physical Exam   Constitutional: He is oriented to person, place, and time. He appears well-developed and well-nourished.   HENT:   Head: Normocephalic.   Eyes: Conjunctivae are normal. Pupils are equal, round, and reactive to light.   Neck: Normal range of motion. Neck supple.   Cardiovascular: Normal rate, regular rhythm and normal heart sounds.   Pulmonary/Chest: Effort  normal and breath sounds normal.   Abdominal: Soft. Bowel sounds are normal.   Musculoskeletal: Normal range of motion.   Neurological: He is alert and oriented to person, place, and time.   Skin: Skin is warm and dry.   Psychiatric: He has a normal mood and affect. His behavior is normal. Judgment and thought content normal.   Patient does have schizophrenia but at the present time is totally stable   Nursing note and vitals reviewed.      Assessment/Plan   Problems Addressed this Visit        Respiratory    Asthma - Primary    Relevant Orders    CBC (No Diff)    Comprehensive Metabolic Panel    Lipid Panel    Vitamin D 25 Hydroxy    Carbamazepine Level, Total       Other    Schizophrenia (CMS/[facility])    Relevant Medications    sertraline (ZOLOFT) 50 MG tablet    Dyslipidemia    Relevant Orders    CBC (No Diff)    Comprehensive Metabolic Panel    Lipid Panel    Vitamin D 25 Hydroxy    Carbamazepine Level, Total      Other Visit Diagnoses     Vitamin D deficiency         Relevant Orders    Vitamin D 25 Hydroxy    Carbamazepine Level, Total

## 2019-07-25 ENCOUNTER — LAB (OUTPATIENT)
Dept: FAMILY MEDICINE CLINIC | Facility: CLINIC | Age: 44
End: 2019-07-25

## 2019-07-25 DIAGNOSIS — E55.9 VITAMIN D DEFICIENCY: ICD-10-CM

## 2019-07-25 DIAGNOSIS — E78.5 DYSLIPIDEMIA: ICD-10-CM

## 2019-07-25 DIAGNOSIS — J45.20 MILD INTERMITTENT ASTHMA WITHOUT COMPLICATION: ICD-10-CM

## 2019-07-25 LAB
25(OH)D3 SERPL-MCNC: 47.9 NG/ML (ref 30–100)
ALBUMIN SERPL-MCNC: 3.9 G/DL (ref 3.5–5.2)
ALBUMIN/GLOB SERPL: 1.3 G/DL
ALP SERPL-CCNC: 57 U/L (ref 39–117)
ALT SERPL W P-5'-P-CCNC: 9 U/L (ref 1–41)
ANION GAP SERPL CALCULATED.3IONS-SCNC: 11 MMOL/L (ref 5–15)
AST SERPL-CCNC: 12 U/L (ref 1–40)
BILIRUB SERPL-MCNC: 0.3 MG/DL (ref 0.2–1.2)
BUN BLD-MCNC: 12 MG/DL (ref 6–20)
BUN/CREAT SERPL: 14.5 (ref 7–25)
CALCIUM SPEC-SCNC: 8.6 MG/DL (ref 8.6–10.5)
CARBAMAZEPINE SERPL-MCNC: <2 MCG/ML (ref 4–12)
CHLORIDE SERPL-SCNC: 95 MMOL/L (ref 98–107)
CHOLEST SERPL-MCNC: 161 MG/DL (ref 0–200)
CO2 SERPL-SCNC: 29 MMOL/L (ref 22–29)
CREAT BLD-MCNC: 0.83 MG/DL (ref 0.76–1.27)
DEPRECATED RDW RBC AUTO: 45.7 FL (ref 37–54)
ERYTHROCYTE [DISTWIDTH] IN BLOOD BY AUTOMATED COUNT: 14.1 % (ref 12.3–15.4)
GFR SERPL CREATININE-BSD FRML MDRD: 100 ML/MIN/1.73
GLOBULIN UR ELPH-MCNC: 2.9 GM/DL
GLUCOSE BLD-MCNC: 77 MG/DL (ref 65–99)
HCT VFR BLD AUTO: 42.1 % (ref 37.5–51)
HDLC SERPL-MCNC: 29 MG/DL (ref 40–60)
HGB BLD-MCNC: 13.6 G/DL (ref 13–17.7)
LDLC SERPL CALC-MCNC: 106 MG/DL (ref 0–100)
LDLC/HDLC SERPL: 3.67 {RATIO}
MCH RBC QN AUTO: 28.7 PG (ref 26.6–33)
MCHC RBC AUTO-ENTMCNC: 32.3 G/DL (ref 31.5–35.7)
MCV RBC AUTO: 88.8 FL (ref 79–97)
PLATELET # BLD AUTO: 282 10*3/MM3 (ref 140–450)
PMV BLD AUTO: 9.8 FL (ref 6–12)
POTASSIUM BLD-SCNC: 4.4 MMOL/L (ref 3.5–5.2)
PROT SERPL-MCNC: 6.8 G/DL (ref 6–8.5)
RBC # BLD AUTO: 4.74 10*6/MM3 (ref 4.14–5.8)
SODIUM BLD-SCNC: 135 MMOL/L (ref 136–145)
TRIGL SERPL-MCNC: 128 MG/DL (ref 0–150)
VLDLC SERPL-MCNC: 25.6 MG/DL (ref 5–40)
WBC NRBC COR # BLD: 9.44 10*3/MM3 (ref 3.4–10.8)

## 2019-07-25 PROCEDURE — 80156 ASSAY CARBAMAZEPINE TOTAL: CPT | Performed by: INTERNAL MEDICINE

## 2019-07-25 PROCEDURE — 80061 LIPID PANEL: CPT | Performed by: INTERNAL MEDICINE

## 2019-07-25 PROCEDURE — 80053 COMPREHEN METABOLIC PANEL: CPT | Performed by: INTERNAL MEDICINE

## 2019-07-25 PROCEDURE — 36415 COLL VENOUS BLD VENIPUNCTURE: CPT | Performed by: INTERNAL MEDICINE

## 2019-07-25 PROCEDURE — 82306 VITAMIN D 25 HYDROXY: CPT | Performed by: INTERNAL MEDICINE

## 2019-07-25 PROCEDURE — 85027 COMPLETE CBC AUTOMATED: CPT | Performed by: INTERNAL MEDICINE

## 2019-09-03 RX ORDER — IPRATROPIUM BROMIDE AND ALBUTEROL SULFATE 2.5; .5 MG/3ML; MG/3ML
3 SOLUTION RESPIRATORY (INHALATION) EVERY 4 HOURS PRN
Qty: 180 ML | Refills: 1 | Status: SHIPPED | OUTPATIENT
Start: 2019-09-03 | End: 2019-10-01 | Stop reason: SDUPTHER

## 2019-09-19 RX ORDER — IPRATROPIUM BROMIDE AND ALBUTEROL SULFATE 2.5; .5 MG/3ML; MG/3ML
3 SOLUTION RESPIRATORY (INHALATION) EVERY 4 HOURS PRN
Qty: 180 ML | Refills: 1 | Status: SHIPPED | OUTPATIENT
Start: 2019-09-19 | End: 2020-03-09

## 2019-10-01 ENCOUNTER — TELEPHONE (OUTPATIENT)
Dept: FAMILY MEDICINE CLINIC | Facility: CLINIC | Age: 44
End: 2019-10-01

## 2019-10-01 RX ORDER — IPRATROPIUM BROMIDE AND ALBUTEROL SULFATE 2.5; .5 MG/3ML; MG/3ML
3 SOLUTION RESPIRATORY (INHALATION) EVERY 4 HOURS PRN
Qty: 180 ML | Refills: 1 | Status: SHIPPED | OUTPATIENT
Start: 2019-10-01 | End: 2019-11-24 | Stop reason: SDUPTHER

## 2019-10-07 RX ORDER — QUETIAPINE FUMARATE 25 MG/1
TABLET, FILM COATED ORAL
Qty: 90 TABLET | Refills: 1 | Status: SHIPPED | OUTPATIENT
Start: 2019-10-07

## 2019-10-24 ENCOUNTER — TELEPHONE (OUTPATIENT)
Dept: FAMILY MEDICINE CLINIC | Facility: CLINIC | Age: 44
End: 2019-10-24

## 2019-10-24 NOTE — TELEPHONE ENCOUNTER
Pt was DX with cancer and wants to speak with Dr Russ. if he could call him. He has question about home care. He wouldn't go into a lot of detail about it. Only wants to speak with his Doctor.     I asked what hospital he was at and he states that he might have been at [facility].

## 2019-11-25 RX ORDER — IPRATROPIUM BROMIDE AND ALBUTEROL SULFATE 2.5; .5 MG/3ML; MG/3ML
3 SOLUTION RESPIRATORY (INHALATION) EVERY 4 HOURS PRN
Qty: 180 ML | Refills: 1 | Status: SHIPPED | OUTPATIENT
Start: 2019-11-25 | End: 2020-01-29 | Stop reason: SDUPTHER

## 2020-01-29 ENCOUNTER — OFFICE VISIT (OUTPATIENT)
Dept: FAMILY MEDICINE CLINIC | Facility: CLINIC | Age: 45
End: 2020-01-29

## 2020-01-29 VITALS
WEIGHT: 175 LBS | OXYGEN SATURATION: 99 % | DIASTOLIC BLOOD PRESSURE: 80 MMHG | SYSTOLIC BLOOD PRESSURE: 130 MMHG | HEART RATE: 99 BPM | HEIGHT: 70 IN | BODY MASS INDEX: 25.05 KG/M2 | TEMPERATURE: 98.3 F | RESPIRATION RATE: 16 BRPM

## 2020-01-29 DIAGNOSIS — Z00.00 MEDICARE ANNUAL WELLNESS VISIT, SUBSEQUENT: Primary | ICD-10-CM

## 2020-01-29 DIAGNOSIS — F20.9 SCHIZOPHRENIA, UNSPECIFIED TYPE (HCC): ICD-10-CM

## 2020-01-29 DIAGNOSIS — E78.5 DYSLIPIDEMIA: ICD-10-CM

## 2020-01-29 DIAGNOSIS — J45.20 MILD INTERMITTENT ASTHMA WITHOUT COMPLICATION: ICD-10-CM

## 2020-01-29 DIAGNOSIS — E55.9 VITAMIN D DEFICIENCY, UNSPECIFIED: ICD-10-CM

## 2020-01-29 PROCEDURE — 99214 OFFICE O/P EST MOD 30 MIN: CPT | Performed by: INTERNAL MEDICINE

## 2020-01-29 PROCEDURE — G0439 PPPS, SUBSEQ VISIT: HCPCS | Performed by: INTERNAL MEDICINE

## 2020-01-29 NOTE — PROGRESS NOTES
Subsequent Medicare Wellness Visit   The ABC's of the Annual Wellness Visit    Chief Complaint   Patient presents with   • Annual Exam     6 month check up       HPI:  PAULA Freeman-1975, is a 44 y.o. male who presents for a Subsequent Medicare Wellness Visit.  Patient was seen for Medicare wellness exam.  Patient was seen for asthma.  Patient is well controlled with his bronchodilators.  Patient lipids treated with exercise.  Patient has been diagnosed with schizophrenia has been seeing a psychiatrist.  Patient is well controlled on his medications.  Patient's vitamin D level supplemented with over-the-counter vitamin D.    Dictated utilizing Dragon dictation. If there are questions or for further clarification, please contact me.  Recent Hospitalizations:  Recently treated at the following:  Other: [facility].    Current Medical Providers:  Patient Care Team:  Kam Russ MD as PCP - General (Internal Medicine)  Kam Russ MD as PCP - Claims Attributed    Health Habits and Functional and Cognitive Screening and Depression Screening:  Functional & Cognitive Status 2020   Do you have difficulty preparing food and eating? No   Do you have difficulty bathing yourself, getting dressed or grooming yourself? No   Do you have difficulty using the toilet? No   Do you have difficulty moving around from place to place? No   Do you have trouble with steps or getting out of a bed or a chair? No   Current Diet Well Balanced Diet   Dental Exam Not up to date   Eye Exam Up to date   Exercise (times per week) 3 times per week   Current Exercise Activities Include Aerobics   Do you need help using the phone?  No   Are you deaf or do you have serious difficulty hearing?  No   Do you need help with transportation? Yes   Do you need help shopping? No   Do you need help preparing meals?  No   Do you need help with housework?  No   Do you need help with laundry? Yes   Do you need help taking your medications? Yes    Do you need help managing money? Yes   Do you ever drive or ride in a car without wearing a seat belt? No   Have you felt unusual stress, anger or loneliness in the last month? No   Who do you live with? Alone   If you need help, do you have trouble finding someone available to you? No   Have you been bothered in the last four weeks by sexual problems? No   Do you have difficulty concentrating, remembering or making decisions? No       Compared to one year ago, the patient feels his physical health is better and his mental health is better.    Depression Screen:  PHQ-2/PHQ-9 Depression Screening 1/29/2020   Little interest or pleasure in doing things 0   Feeling down, depressed, or hopeless 0   Trouble falling or staying asleep, or sleeping too much 0   Feeling tired or having little energy 0   Poor appetite or overeating 0   Feeling bad about yourself - or that you are a failure or have let yourself or your family down 0   Trouble concentrating on things, such as reading the newspaper or watching television 0   Moving or speaking so slowly that other people could have noticed. Or the opposite - being so fidgety or restless that you have been moving around a lot more than usual 0   Thoughts that you would be better off dead, or of hurting yourself in some way 0   Total Score 0   If you checked off any problems, how difficult have these problems made it for you to do your work, take care of things at home, or get along with other people? Not difficult at all         Past Medical/Family/Social History:  The following portions of the patient's history were reviewed and updated as appropriate: allergies, current medications, past family history, past medical history, past social history, past surgical history and problem list.    No Known Allergies      Current Outpatient Medications:   •  albuterol (VENTOLIN HFA) 108 (90 Base) MCG/ACT inhaler, Inhale 2 puffs Every 6 (Six) Hours As Needed for Wheezing., Disp: 18  inhaler, Rfl: 3  •  divalproex (DEPAKOTE) 500 MG DR tablet, TAKE 1 TABLET BY MOUTH TWICE A DAY (Patient taking differently: TAKE 1 TABLET BY MOUTH in am  and 2 at night), Disp: 60 tablet, Rfl: 0  •  fluticasone-salmeterol (ADVAIR DISKUS) 250-50 MCG/DOSE DISKUS, Inhale 1 puff 2 (Two) Times a Day. Rinse & Spit Afterwards, Disp: 60 each, Rfl: 3  •  ipratropium-albuterol (DUO-NEB) 0.5-2.5 mg/3 ml nebulizer, TAKE 3 ML BY NEBULIZATION EVERY 4 (FOUR) HOURS AS NEEDED FOR SHORTNESS OF AIR., Disp: 180 mL, Rfl: 1  •  Nebulizer device, 1 Units 4 (Four) Times a Day As Needed (wheezing)., Disp: 1 each, Rfl: 1  •  QUEtiapine (SEROquel) 25 MG tablet, TAKE 1 TABLET BY MOUTH EVERY DAY AT NIGHT, Disp: 90 tablet, Rfl: 1  •  QUEtiapine (SEROquel) 300 MG tablet, Take 1 tablet by mouth Every Night., Disp: 90 tablet, Rfl: 3  •  sertraline (ZOLOFT) 100 MG tablet, TAKE 1 TABLET BY MOUTH EVERY DAY, Disp: 30 tablet, Rfl: 0  •  sertraline (ZOLOFT) 50 MG tablet, Take 50 mg by mouth Daily., Disp: , Rfl:   •  STRIVERDI RESPIMAT 2.5 MCG/ACT aerosol solution, INHALE 2 PUFF PO QD, Disp: , Rfl: 1  •  Tiotropium Bromide Monohydrate 2.5 MCG/ACT aerosol solution, Inhale 2.5 mcg See Admin Instructions. 2 puffs once daily, Disp: 4 inhaler, Rfl: 3    Aspirin use counseling: Does not need ASA (and currently is not on it)    Current medication list contains no high risk medications.  No harmful drug interactions have been identified.     Family History   Adopted: Yes   Problem Relation Age of Onset   • Heart attack Mother    • Diabetes Mother        Social History     Tobacco Use   • Smoking status: Former Smoker     Packs/day: 2.00   • Tobacco comment: since age 16.   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   HENT: Positive for congestion. Negative for trouble swallowing.    Eyes: Negative for discharge and visual disturbance.   Respiratory: Negative for choking and shortness of breath.    Cardiovascular: Negative for chest pain and palpitations.   Gastrointestinal: Negative for abdominal pain and blood in stool.   Endocrine: Negative.    Genitourinary: Negative for genital sores and hematuria.   Musculoskeletal: Negative for gait problem and joint swelling.   Skin: Negative for color change, pallor, rash and wound.   Allergic/Immunologic: Positive for environmental allergies. Negative for immunocompromised state.   Neurological: Negative for facial asymmetry and speech difficulty.   Psychiatric/Behavioral: Positive for confusion and decreased concentration. Negative for agitation, behavioral problems, hallucinations and suicidal ideas.       Objective     Vitals:    01/29/20 0850   BP: 130/80   BP Location: Left arm   Patient Position: Sitting   Cuff Size: Adult   Pulse: 99   Resp: 16   Temp: 98.3 °F (36.8 °C)   TempSrc: Oral   SpO2: 99%   Weight: 79.4 kg (175 lb)   Height: 177.8 cm (70\")   PainSc: 0-No pain       Patient's Body mass index is 25.11 kg/m². BMI is within normal parameters. No follow-up required..      No exam data present    The patient has no evidence of cognitve impairment.     Physical Exam   Constitutional: He appears well-developed and well-nourished.   HENT:   Head: Normocephalic.   Right Ear: External ear normal.   Left Ear: External ear normal.   Nose: Nose normal.   Mouth/Throat: Oropharynx is clear and moist.   Eyes: Pupils are equal, round, and reactive to light. Conjunctivae and EOM are normal. "   Neck: Normal range of motion. Neck supple.   Cardiovascular: Normal rate, regular rhythm, normal heart sounds and intact distal pulses.   Pulmonary/Chest: Breath sounds normal.   Abdominal: Soft. Bowel sounds are normal.   Genitourinary: Rectum normal, prostate normal and penis normal.   Musculoskeletal: Normal range of motion.   Neurological: He is alert.   Skin: Skin is warm and dry.   Psychiatric:   Patient has schizophrenia is on medication and seeing a psychiatrist.       Recent Lab Results:     Lab Results   Component Value Date    CHOL 161 07/25/2019    TRIG 128 07/25/2019    HDL 29 (L) 07/25/2019    VLDL 25.6 07/25/2019    LDLHDL 3.67 07/25/2019       Assessment/Plan   Age-appropriate Screening Schedule:  Refer to the list below for future screening recommendations based on patient's age, sex and/or medical conditions.      Health Maintenance   Topic Date Due   • TDAP/TD VACCINES (2 - Td) 03/19/2028   • INFLUENZA VACCINE  Completed       Medicare Risks and Personalized Health Plan:  Advance Directive Discussion      CMS-Preventive Services Quick Reference  Medicare Preventive Services Addressed:  NA    Advance Care Planning: #1 continue seeing psychiatrist #2 continue to monitor blood pressure at home #3 labs  Patient does not have an advance directive - information provided to the patient today    Diagnoses and all orders for this visit:    1. Medicare annual wellness visit, subsequent (Primary)    2. Mild intermittent asthma without complication  -     CBC (No Diff); Future  -     Comprehensive Metabolic Panel; Future  -     Lipid Panel; Future  -     Vitamin D 25 Hydroxy; Future    3. Dyslipidemia  -     CBC (No Diff); Future  -     Comprehensive Metabolic Panel; Future  -     Lipid Panel; Future  -     Vitamin D 25 Hydroxy; Future    4. Schizophrenia, unspecified type (CMS/[facility])  -     CBC (No Diff); Future  -     Comprehensive Metabolic Panel; Future  -     Lipid Panel; Future  -     Vitamin D 25  Hydroxy; Future  -     Valproic Acid Level, Total    5. Vitamin D deficiency, unspecified   -     Vitamin D 25 Hydroxy; Future        An After Visit Summary and PPPS with all of these plans were given to the patient.      Follow Up:  Return in about 6 months (around 7/29/2020), or if symptoms worsen or fail to improve, for Recheck.

## 2020-01-29 NOTE — PATIENT INSTRUCTIONS
Medicare Wellness  Personal Prevention Plan of Service     Date of Office Visit:  2020  Encounter Provider:  Kam Russ MD  Place of Service:  [facility]  Patient Name: Armaan Pal  :  1975    As part of the Medicare Wellness portion of your visit today, we are providing you with this personalized preventive plan of services (PPPS). This plan is based upon recommendations of the [place] Preventive Services Task Force (USPSTF) and the Advisory Committee on Immunization Practices (ACIP).    This lists the preventive care services that should be considered, and provides dates of when you are due. Items listed as completed are up-to-date and do not require any further intervention.    Health Maintenance   Topic Date Due   • MEDICARE ANNUAL WELLNESS  2016   • TDAP/TD VACCINES (2 - Td) 2028   • INFLUENZA VACCINE  Completed       No orders of the defined types were placed in this encounter.      No follow-ups on file.

## 2020-01-30 ENCOUNTER — LAB (OUTPATIENT)
Dept: FAMILY MEDICINE CLINIC | Facility: CLINIC | Age: 45
End: 2020-01-30

## 2020-01-30 DIAGNOSIS — F20.9 SCHIZOPHRENIA, UNSPECIFIED TYPE (HCC): ICD-10-CM

## 2020-01-30 DIAGNOSIS — E78.5 DYSLIPIDEMIA: ICD-10-CM

## 2020-01-30 DIAGNOSIS — D64.9 ANEMIA, UNSPECIFIED TYPE: Primary | ICD-10-CM

## 2020-01-30 DIAGNOSIS — E55.9 VITAMIN D DEFICIENCY, UNSPECIFIED: ICD-10-CM

## 2020-01-30 DIAGNOSIS — J45.20 MILD INTERMITTENT ASTHMA WITHOUT COMPLICATION: ICD-10-CM

## 2020-01-30 LAB
25(OH)D3 SERPL-MCNC: 28.6 NG/ML (ref 30–100)
ALBUMIN SERPL-MCNC: 4.2 G/DL (ref 3.5–5.2)
ALBUMIN/GLOB SERPL: 1.8 G/DL
ALP SERPL-CCNC: 49 U/L (ref 39–117)
ALT SERPL W P-5'-P-CCNC: 9 U/L (ref 1–41)
ANION GAP SERPL CALCULATED.3IONS-SCNC: 14 MMOL/L (ref 5–15)
AST SERPL-CCNC: 15 U/L (ref 1–40)
BILIRUB SERPL-MCNC: 0.3 MG/DL (ref 0.2–1.2)
BUN BLD-MCNC: 15 MG/DL (ref 6–20)
BUN/CREAT SERPL: 19.7 (ref 7–25)
CALCIUM SPEC-SCNC: 8.9 MG/DL (ref 8.6–10.5)
CHLORIDE SERPL-SCNC: 96 MMOL/L (ref 98–107)
CHOLEST SERPL-MCNC: 178 MG/DL (ref 0–200)
CO2 SERPL-SCNC: 27 MMOL/L (ref 22–29)
CREAT BLD-MCNC: 0.76 MG/DL (ref 0.76–1.27)
DEPRECATED RDW RBC AUTO: 70 FL (ref 37–54)
ERYTHROCYTE [DISTWIDTH] IN BLOOD BY AUTOMATED COUNT: 21.1 % (ref 12.3–15.4)
GFR SERPL CREATININE-BSD FRML MDRD: 111 ML/MIN/1.73
GLOBULIN UR ELPH-MCNC: 2.4 GM/DL
GLUCOSE BLD-MCNC: 84 MG/DL (ref 65–99)
HCT VFR BLD AUTO: 31 % (ref 37.5–51)
HDLC SERPL-MCNC: 41 MG/DL (ref 40–60)
HGB BLD-MCNC: 10.2 G/DL (ref 13–17.7)
LDLC SERPL CALC-MCNC: 108 MG/DL (ref 0–100)
LDLC/HDLC SERPL: 2.62 {RATIO}
MCH RBC QN AUTO: 30.4 PG (ref 26.6–33)
MCHC RBC AUTO-ENTMCNC: 32.9 G/DL (ref 31.5–35.7)
MCV RBC AUTO: 92.5 FL (ref 79–97)
PLATELET # BLD AUTO: 154 10*3/MM3 (ref 140–450)
PMV BLD AUTO: 10.6 FL (ref 6–12)
POTASSIUM BLD-SCNC: 4.5 MMOL/L (ref 3.5–5.2)
PROT SERPL-MCNC: 6.6 G/DL (ref 6–8.5)
RBC # BLD AUTO: 3.35 10*6/MM3 (ref 4.14–5.8)
SODIUM BLD-SCNC: 137 MMOL/L (ref 136–145)
TRIGL SERPL-MCNC: 147 MG/DL (ref 0–150)
VALPROATE SERPL-MCNC: 74 MCG/ML (ref 50–125)
VLDLC SERPL-MCNC: 29.4 MG/DL (ref 5–40)
WBC NRBC COR # BLD: 2.85 10*3/MM3 (ref 3.4–10.8)

## 2020-01-30 PROCEDURE — 80053 COMPREHEN METABOLIC PANEL: CPT | Performed by: INTERNAL MEDICINE

## 2020-01-30 PROCEDURE — 36415 COLL VENOUS BLD VENIPUNCTURE: CPT | Performed by: INTERNAL MEDICINE

## 2020-01-30 PROCEDURE — 82306 VITAMIN D 25 HYDROXY: CPT | Performed by: INTERNAL MEDICINE

## 2020-01-30 PROCEDURE — 85027 COMPLETE CBC AUTOMATED: CPT | Performed by: INTERNAL MEDICINE

## 2020-01-30 PROCEDURE — 80061 LIPID PANEL: CPT | Performed by: INTERNAL MEDICINE

## 2020-01-30 PROCEDURE — 80164 ASSAY DIPROPYLACETIC ACD TOT: CPT | Performed by: INTERNAL MEDICINE

## 2020-02-03 ENCOUNTER — TELEPHONE (OUTPATIENT)
Dept: FAMILY MEDICINE CLINIC | Facility: CLINIC | Age: 45
End: 2020-02-03

## 2020-02-27 ENCOUNTER — TELEPHONE (OUTPATIENT)
Dept: FAMILY MEDICINE CLINIC | Facility: CLINIC | Age: 45
End: 2020-02-27

## 2020-02-27 NOTE — TELEPHONE ENCOUNTER
PATIENT IS REQUESTING THAT THE LABS THAT WAS DONE IN JAN BE FAXED TO [facility] [phone]. IF THERE ARE ANY QUESTIONS OR CONCERNS PLEASE CALL THE PATIENT [phone]

## 2020-03-09 RX ORDER — IPRATROPIUM BROMIDE AND ALBUTEROL SULFATE 2.5; .5 MG/3ML; MG/3ML
SOLUTION RESPIRATORY (INHALATION)
Qty: 540 ML | Refills: 1 | Status: SHIPPED | OUTPATIENT
Start: 2020-03-09 | End: 2020-03-17 | Stop reason: SDUPTHER

## 2020-03-17 ENCOUNTER — TELEPHONE (OUTPATIENT)
Dept: FAMILY MEDICINE CLINIC | Facility: CLINIC | Age: 45
End: 2020-03-17

## 2020-03-17 RX ORDER — IPRATROPIUM BROMIDE AND ALBUTEROL SULFATE 2.5; .5 MG/3ML; MG/3ML
3 SOLUTION RESPIRATORY (INHALATION) EVERY 4 HOURS PRN
Qty: 540 ML | Refills: 1 | Status: SHIPPED | OUTPATIENT
Start: 2020-03-17

## 2020-06-29 ENCOUNTER — TELEPHONE (OUTPATIENT)
Dept: FAMILY MEDICINE CLINIC | Facility: CLINIC | Age: 45
End: 2020-06-29

## 2021-03-31 ENCOUNTER — BULK ORDERING (OUTPATIENT)
Dept: CASE MANAGEMENT | Facility: OTHER | Age: 46
End: 2021-03-31

## 2021-03-31 DIAGNOSIS — Z23 IMMUNIZATION DUE: ICD-10-CM
